# Patient Record
Sex: FEMALE | URBAN - METROPOLITAN AREA
[De-identification: names, ages, dates, MRNs, and addresses within clinical notes are randomized per-mention and may not be internally consistent; named-entity substitution may affect disease eponyms.]

---

## 2017-09-22 ENCOUNTER — OFFICE VISIT (OUTPATIENT)
Dept: INTERNAL MEDICINE CLINIC | Age: 37
End: 2017-09-22

## 2017-09-22 VITALS
HEART RATE: 48 BPM | DIASTOLIC BLOOD PRESSURE: 59 MMHG | BODY MASS INDEX: 31.84 KG/M2 | RESPIRATION RATE: 16 BRPM | WEIGHT: 137.6 LBS | SYSTOLIC BLOOD PRESSURE: 106 MMHG | HEIGHT: 55 IN | TEMPERATURE: 97.6 F

## 2017-09-22 DIAGNOSIS — R82.90 ABNORMAL URINALYSIS: ICD-10-CM

## 2017-09-22 DIAGNOSIS — M79.631 PAIN IN BOTH FOREARMS: ICD-10-CM

## 2017-09-22 DIAGNOSIS — M54.50 CHRONIC BILATERAL LOW BACK PAIN WITHOUT SCIATICA: ICD-10-CM

## 2017-09-22 DIAGNOSIS — M79.632 PAIN IN BOTH FOREARMS: ICD-10-CM

## 2017-09-22 DIAGNOSIS — M25.572 CHRONIC PAIN OF BOTH ANKLES: Primary | ICD-10-CM

## 2017-09-22 DIAGNOSIS — R31.9 HEMATURIA: ICD-10-CM

## 2017-09-22 DIAGNOSIS — M23.8X1 KNEE CREPITUS, RIGHT: ICD-10-CM

## 2017-09-22 DIAGNOSIS — M25.571 CHRONIC PAIN OF BOTH ANKLES: Primary | ICD-10-CM

## 2017-09-22 DIAGNOSIS — G89.29 CHRONIC PAIN OF BOTH ANKLES: Primary | ICD-10-CM

## 2017-09-22 DIAGNOSIS — G89.29 CHRONIC BILATERAL LOW BACK PAIN WITHOUT SCIATICA: ICD-10-CM

## 2017-09-22 LAB
BILIRUB UR QL STRIP: NEGATIVE
GLUCOSE UR-MCNC: NEGATIVE MG/DL
KETONES P FAST UR STRIP-MCNC: NEGATIVE MG/DL
PH UR STRIP: 5.5 [PH] (ref 4.6–8)
PROT UR QL STRIP: NEGATIVE MG/DL
SP GR UR STRIP: 1.02 (ref 1–1.03)
UA UROBILINOGEN AMB POC: NORMAL (ref 0.2–1)
URINALYSIS CLARITY POC: CLEAR
URINALYSIS COLOR POC: YELLOW
URINE BLOOD POC: NORMAL
URINE LEUKOCYTES POC: NORMAL
URINE NITRITES POC: NEGATIVE

## 2017-09-22 NOTE — PROGRESS NOTES
History of Present Illness:   Ivette Gonzalez is a 29 y.o. female here for evaluation:    Pt speaks only [place], but prefers for  to translate. Waiver signed by pt using [employer] . Chief Complaint   Patient presents with   [facility] Establish Care    Leg Pain     both legs of and on from knees down x 1 yr    Arm Pain     both arms off and on for 1 yr     Notes:  Pt presents today with her family  Pt  will be translating for his wife  Pt moved here 3 months ago from [facility]   Pt filled out  waiver form using [employer] # [id number] -[place]       Notes pains seem different in UE's and LE's--not related. Notes UE pain is primarily in her forearms bilat. Has pain which is aching even at rest.  Worse with activity/using UE's. Improves with paracetamol or massage. Notes pain at rest.    Notes weakness in her hands associated with the pain. For the LE pain:  Notes in legs, pain is not aching like hands  She notes when squats, right knee will make a \"cracking\" sound. No prior injury reported to right knee. Notes aching of her ankles bilat. There is weakness in ankles also. Notes weakness related to aching and pain. Notes foot aching is sometimes. No FH noted, except her mom has same problems but no clear dx. She has not had eval prior for this-had emigrated from [place] 3mo ago. They have copy of health dept labs at home. No abnormalities noted with that eval.        No chronic medical dx's noted. Prior to Admission medications    Not on File        ROS  Complete ROS negative except as indicated in note. Vitals:    09/22/17 1514   BP: 106/59   Pulse: (!) 48   Resp: 16   Temp: 97.6 °F (36.4 °C)   TempSrc: Oral   Weight: 137 lb 9.6 oz (62.4 kg)   Height: 1' 11.72\" (0.602 m)   PainSc:   0 - No pain   LMP: 09/01/2017        Physical Exam:     Physical Exam   Constitutional: She appears well-developed and well-nourished. No distress.    HENT:   Head: Normocephalic and atraumatic. Eyes: Conjunctivae are normal. Right eye exhibits no discharge. Left eye exhibits no discharge. No scleral icterus. Neck: Neck supple. Cardiovascular: Normal rate, regular rhythm, normal heart sounds and intact distal pulses. Exam reveals no gallop and no friction rub. No murmur heard. Pulmonary/Chest: Effort normal and breath sounds normal. No respiratory distress. She has no wheezes. She has no rales. She exhibits no tenderness. Abdominal: Soft. Bowel sounds are normal. She exhibits no distension. There is no tenderness. Musculoskeletal: She exhibits no edema, tenderness or deformity. No synovitis noted bilat hands or ankles bilat. Motor grossly intact and symmetric bilat LE's and UE's. No erythema noted. Pain noted with ant/post, lateral/medial stress ankles bilat. Bilat ankles stable to ant/post, lateral/medial stress. Neurological: She is alert. She exhibits normal muscle tone. Coordination normal.   Possible decreased sensation to light touch bilat UE's. Intact sensation bilat LE's and UE's to monofilament (feet to ankles bilat; hands to wrist bilat). Skin: Skin is warm. No rash noted. She is not diaphoretic. No erythema. No pallor. Psychiatric: She has a normal mood and affect.  Her behavior is normal. Judgment and thought content normal.       Results for orders placed or performed in visit on 09/22/17   AMB POC URINALYSIS DIP STICK AUTO W/O MICRO   Result Value Ref Range    Color (UA POC) Yellow     Clarity (UA POC) Clear     Glucose (UA POC) Negative Negative    Bilirubin (UA POC) Negative Negative    Ketones (UA POC) Negative Negative    Specific gravity (UA POC) 1.025 1.001 - 1.035    Blood (UA POC) 2+ Negative    pH (UA POC) 5.5 4.6 - 8.0    Protein (UA POC) Negative Negative mg/dL    Urobilinogen (UA POC) 0.2 mg/dL 0.2 - 1    Nitrites (UA POC) Negative Negative    Leukocyte esterase (UA POC) Trace Negative         Assessment and Plan: ICD-10-CM ICD-9-CM    1. Chronic pain of both ankles M25.571 719.47 T4, FREE    G89.29 338.29 TSH 3RD GENERATION    M25.572  CK      VITAMIN D, 25 HYDROXY      VITAMIN B12 & FOLATE      HEMOGLOBIN A1C WITH EAG      ANTINUCLEAR ANTIBODIES, IFA      RA + CCP ABS      C REACTIVE PROTEIN, QT      SED RATE (ESR)      CBC WITH AUTOMATED DIFF      METABOLIC PANEL, COMPREHENSIVE      REFERRAL TO PHYSICAL THERAPY   2. Pain in both forearms M79.632 729.5 T4, FREE    M79.631  TSH 3RD GENERATION      CK      VITAMIN D, 25 HYDROXY      VITAMIN B12 & FOLATE      HEMOGLOBIN A1C WITH EAG      ANTINUCLEAR ANTIBODIES, IFA      RA + CCP ABS      C REACTIVE PROTEIN, QT      SED RATE (ESR)      CBC WITH AUTOMATED DIFF      METABOLIC PANEL, COMPREHENSIVE      REFERRAL TO PHYSICAL THERAPY   3. Knee crepitus, right M23.8X1 719.66 XR KNEE RT MAX 2 VWS      REFERRAL TO PHYSICAL THERAPY   4. Chronic bilateral low back pain without sciatica G49.2 415.0 METABOLIC PANEL, COMPREHENSIVE    G89.29 338.29 AMB POC URINALYSIS DIP STICK AUTO W/O MICRO      HLA-B27      REFERRAL TO PHYSICAL THERAPY   5. Hematuria R31.9 599.70 MICROSCOPIC EXAMINATION      CULTURE, URINE   6. Abnormal urinalysis R82.90 791.9 MICROSCOPIC EXAMINATION      CULTURE, URINE       1,2,3,4:  Non-fasting labs, x-ray right knee, PT referral reviewed with pt and  at visit. If above labs do not suggest alternative dx, consider neuro and/or rheumatology referral(s) at follow-up lab review. Continued paracetamol, with PRN naprosyn reviewed with  at visit. Requested  bring prior health dept labs and vaccine records to next visit.    5,6:  Reviewed with --results and additional testing. Consider renal or  referral at follow-up, if hematuria persists, and not UTI-related. Follow-up Disposition:  Return in about 2 weeks (around 10/6/2017) for medication follow-up, lab review--1-2 weeks.   lab results and schedule of future lab studies reviewed with patient  reviewed diet, exercise and weight control  reviewed medications and side effects in detail  radiology results and schedule of future radiology studies reviewed with patient    For additional documentation of information and/or recommendations discussed this visit, please see notes in instructions. Plan and evaluation (above) reviewed with pt at visit  Patient voiced understanding of plan and provided with time to ask/review questions. After Visit Summary (AVS) provided to pt after visit with additional instructions as needed/reviewed.

## 2017-09-22 NOTE — PATIENT INSTRUCTIONS
You can continue to take paracetamol as needed for your pain. --Typically take 500mg to 650mg every 6 hours. --Do not need to take with food. --Do not drink alcohol with paracetamol (acetaminophen or Tylenol). You can take naprosyn (Aleve) with the paracetamol as reviewed. --This medication should be taken with food. --The over the counter dose/tablet is usually 220mg. --You can take two of the 220mg tabs (440mg total per dose) every 12 hours (two times a day) with food, for pain to see if helps. --This is a better medicine if the pain is from inflammation. --If you get stomach pain or discomfort with the naprosyn, stop taking and will review at follow-up. Learning About Living James Garcia  What is a living will? A living will is a legal form you use to write down the kind of care you want at the end of your life. It is used by the health professionals who will treat you if you aren't able to decide for yourself. If you put your wishes in writing, your loved ones and others will know what kind of care you want. They won't need to guess. This can ease your mind and be helpful to others. A living will is not the same as an estate or property will. An estate will explains what you want to happen with your money and property after you die. Is a living will a legal document? A living will is a legal document. Each state has its own laws about living [place]. If you move to another state, make sure that your living will is legal in the state where you now live. Or you might use a universal form that has been approved by many states. This kind of form can sometimes be completed and stored online. Your electronic copy will then be available wherever you have a connection to the Internet. In most cases, doctors will respect your wishes even if you have a form from a different state. · You don't need an  to complete a living will.  But legal advice can be helpful if your state's laws are unclear, your health history is complicated, or your family can't agree on what should be in your living will. · You can change your living will at any time. Some people find that their wishes about end-of-life care change as their health changes. · In addition to making a living will, think about completing a medical power of  form. This form lets you name the person you want to make end-of-life treatment decisions for you (your \"health care agent\") if you're not able to. Many hospitals and nursing homes will give you the forms you need to complete a living will and a medical power of . · Your living will is used only if you can't make or communicate decisions for yourself anymore. If you become able to make decisions again, you can accept or refuse any treatment, no matter what you wrote in your living will. · Your state may offer an online registry. This is a place where you can store your living will online so the doctors and nurses who need to treat you can find it right away. What should you think about when creating a living will? Talk about your end-of-life wishes with your family members and your doctor. Let them know what you want. That way the people making decisions for you won't be surprised by your choices. Think about these questions as you make your living will:  · Do you know enough about life support methods that might be used? If not, talk to your doctor so you know what might be done if you can't breathe on your own, your heart stops, or you're unable to swallow. · What things would you still want to be able to do after you receive life-support methods? Would you want to be able to walk? To speak? To eat on your own? To live without the help of machines? · If you have a choice, where do you want to be cared for? In your home? At a hospital or nursing home? · Do you want certain Ep[employer]opalian practices performed if you become very ill?   · If you have a choice at the end of your life, where would you prefer to die? At home? In a hospital or nursing home? Somewhere else? · Would you prefer to be buried or cremated? · Do you want your organs to be donated after you die? What should you do with your living will? · Make sure that your family members and your health care agent have copies of your living will. · Give your doctor a copy of your living will to keep in your medical record. If you have more than one doctor, make sure that each one has a copy. · You may want to put a copy of your living will where it can be easily found. Where can you learn more? Go to http://alan-elise.info/. Enter X230 in the search box to learn more about \"Learning About Living [place]. \"  Current as of: August 8, 2016  Content Version: 11.3  © 5720-8654 [employer]. Care instructions adapted under license by [employer] (which disclaims liability or warranty for this information). If you have questions about a medical condition or this instruction, always ask your healthcare professional. [address] any warranty or liability for your use of this information.

## 2017-09-22 NOTE — MR AVS SNAPSHOT
Visit Information Date & Time Provider Department Dept. Phone Encounter #  
 9/22/2017  2:45 PM Osiris Quezada, [address] [place] [address] and Internal Medicine [phone] [id number] Follow-up Instructions Return in about 2 weeks (around 10/6/2017) for medication follow-up, lab review--1-2 weeks. Upcoming Health Maintenance Date Due DTaP/Tdap/Td series (1 - Tdap) 12/3/2002 PAP AKA CERVICAL CYTOLOGY 12/3/2002 INFLUENZA AGE 9 TO ADULT 8/1/2017 Allergies as of 9/22/2017  Review Complete On: 9/22/2017 By: Osiris Quezada MD  
 Not on File Current Immunizations  Never Reviewed No immunizations on file. Not reviewed this visit You Were Diagnosed With   
  
 Codes Comments Chronic pain of both ankles    -  Primary ICD-10-CM: M25.571, G89.29, M25.572 ICD-9-CM: 719.47, 338.29 Pain in both forearms     ICD-10-CM: [address], G14.973 ICD-9-CM: 729.5 Knee crepitus, right     ICD-10-CM: M23.8X1 ICD-9-CM: 719.66 Chronic bilateral low back pain without sciatica     ICD-10-CM: M54.5, G89.29 ICD-9-CM: 724.2, 338.29 Vitals BP Pulse Temp Resp Height(growth percentile) Weight(growth percentile) 106/59 (BP 1 Location: Left arm, BP Patient Position: Sitting) (!) 48 97.6 °F (36.4 °C) (Oral) 16 1' 11.72\" (0.602 m) 137 lb 9.6 oz (62.4 kg) LMP BMI OB Status Smoking Status 09/01/2017 171.95 kg/m2 Having regular periods Never Smoker BMI and BSA Data Body Mass Index Body Surface Area  
 171.95 kg/m 2 1.02 m 2 Preferred Pharmacy Pharmacy Name Phone [id number] [address] [phone] Your Updated Medication List  
  
Notice  As of 9/22/2017  4:03 PM  
 You have not been prescribed any medications. We Performed the Following AMB POC URINALYSIS DIP STICK AUTO W/O MICRO [51818 CPT(R)] ANTINUCLEAR ANTIBODIES, IFA [id number] CPT(R)] C REACTIVE PROTEIN, QT [11476 CPT(R)] CBC WITH AUTOMATED DIFF [95860 CPT(R)] CK [id number] CPT(R)] HEMOGLOBIN A1C WITH EAG [14204 CPT(R)] HLA-B27 [id number] CPT(R)] METABOLIC PANEL, COMPREHENSIVE [44780 CPT(R)]   
 RA + CCP ABS [[id number] Custom] REFERRAL TO PHYSICAL THERAPY [MMQ15 Custom] Comments:  
 Please evaluate patient for lower back pain, bilateral forearm and hand pain, leg and bilateral ankle pain, right knee pain/crepitus. T4, FREE [id number] CPT(R)] TSH 3RD GENERATION [73776 CPT(R)] VITAMIN B12 & FOLATE [18113 CPT(R)] VITAMIN D, 25 HYDROXY [id number] CPT(R)] Follow-up Instructions Return in about 2 weeks (around 10/6/2017) for medication follow-up, lab review--1-2 weeks. To-Do List   
 09/22/2017 Lab:  SED RATE (ESR)   
  
 09/22/2017 Imaging:  XR KNEE RT MAX 2 VWS Referral Information Referral ID Referred By Referred To  
  
 [id number] Fransicso Mcclendon Not Available Visits Status Start Date End Date 1 New Request 9/22/17 9/22/18 If your referral has a status of pending review or denied, additional information will be sent to support the outcome of this decision. Patient Instructions You can continue to take paracetamol as needed for your pain. --Typically take 500mg to 650mg every 6 hours. --Do not need to take with food. --Do not drink alcohol with paracetamol (acetaminophen or Tylenol). You can take naprosyn (Aleve) with the paracetamol as reviewed. --This medication should be taken with food. --The over the counter dose/tablet is usually 220mg. --You can take two of the 220mg tabs (440mg total per dose) every 12 hours (two times a day) with food, for pain to see if helps. --This is a better medicine if the pain is from inflammation. --If you get stomach pain or discomfort with the naprosyn, stop taking and will review at follow-up. [address] What is a living will? A living will is a legal form you use to write down the kind of care you want at the end of your life. It is used by the health professionals who will treat you if you aren't able to decide for yourself. If you put your wishes in writing, your loved ones and others will know what kind of care you want. They won't need to guess. This can ease your mind and be helpful to others. A living will is not the same as an estate or property will. An estate will explains what you want to happen with your money and property after you die. Is a living will a legal document? A living will is a legal document. Each state has its own laws about living [place]. If you move to another state, make sure that your living will is legal in the state where you now live. Or you might use a universal form that has been approved by many states. This kind of form can sometimes be completed and stored online. Your electronic copy will then be available wherever you have a connection to the Internet. In most cases, doctors will respect your wishes even if you have a form from a different state. · You don't need an  to complete a living will. But legal advice can be helpful if your state's laws are unclear, your health history is complicated, or your family can't agree on what should be in your living will. · You can change your living will at any time. Some people find that their wishes about end-of-life care change as their health changes. · In addition to making a living will, think about completing a medical power of  form. This form lets you name the person you want to make end-of-life treatment decisions for you (your \"health care agent\") if you're not able to. Many hospitals and nursing homes will give you the forms you need to complete a living will and a medical power of .  
· Your living will is used only if you can't make or communicate decisions for yourself anymore. If you become able to make decisions again, you can accept or refuse any treatment, no matter what you wrote in your living will. · Your state may offer an online registry. This is a place where you can store your living will online so the doctors and nurses who need to treat you can find it right away. What should you think about when creating a living will? Talk about your end-of-life wishes with your family members and your doctor. Let them know what you want. That way the people making decisions for you won't be surprised by your choices. Think about these questions as you make your living will: · Do you know enough about life support methods that might be used? If not, talk to your doctor so you know what might be done if you can't breathe on your own, your heart stops, or you're unable to swallow. · What things would you still want to be able to do after you receive life-support methods? Would you want to be able to walk? To speak? To eat on your own? To live without the help of machines? · If you have a choice, where do you want to be cared for? In your home? At a hospital or nursing home? · Do you want certain [employer] practices performed if you become very ill? · If you have a choice at the end of your life, where would you prefer to die? At home? In a hospital or nursing home? Somewhere else? · Would you prefer to be buried or cremated? · Do you want your organs to be donated after you die? What should you do with your living will? · Make sure that your family members and your health care agent have copies of your living will. · Give your doctor a copy of your living will to keep in your medical record. If you have more than one doctor, make sure that each one has a copy. · You may want to put a copy of your living will where it can be easily found. Where can you learn more? Go to http://alan-elise.info/. Enter S600 in the search box to learn more about \"Learning About Living Joyce. \" Current as of: August 8, 2016 Content Version: 11.3 © 9221-0516 [email or web address]. Care instructions adapted under license by [employer] (which disclaims liability or warranty for this information). If you have questions about a medical condition or this instruction, always ask your healthcare professional. [address] any warranty or liability for your use of this information. Introducing [facility]! Rupa Ga introduces [employer] patient portal. Now you can access parts of your medical record, email your doctor's office, and request medication refills online. 1. In your internet browser, go to [email or web address] 2. Click on the First Time User? Click Here link in the Sign In box. You will see the New Member Sign Up page. 3. Enter your [employer] Access Code exactly as it appears below. You will not need to use this code after youve completed the sign-up process. If you do not sign up before the expiration date, you must request a new code. · [employer] Access Code: [email or web address] Expires: 12/21/2017  2:18 PM 
 
4. Enter the last four digits of your Social Security Number (xxxx) and Date of Birth (mm/dd/yyyy) as indicated and click Submit. You will be taken to the next sign-up page. 5. Create a [employer] ID. This will be your [employer] login ID and cannot be changed, so think of one that is secure and easy to remember. 6. Create a [employer] password. You can change your password at any time. 7. Enter your Password Reset Question and Answer. This can be used at a later time if you forget your password. 8. Enter your e-mail address. You will receive e-mail notification when new information is available in [address] 9. Click Sign Up. You can now view and download portions of your medical record. 10. Click the Download Summary menu link to download a portable copy of your medical information. If you have questions, please visit the Frequently Asked Questions section of the [employer] website. Remember, [employer] is NOT to be used for urgent needs. For medical emergencies, dial 911. Now available from your iPhone and Android! Please provide this summary of care documentation to your next provider. Your primary care clinician is listed as [facility]. If you have any questions after today's visit, please call [phone].

## 2017-09-23 LAB
BACTERIA #/AREA URNS HPF: ABNORMAL /[HPF]
CASTS URNS QL MICRO: ABNORMAL /LPF
CRYSTALS URNS MICRO: ABNORMAL
EPI CELLS #/AREA URNS HPF: >10 /HPF
MUCOUS THREADS URNS QL MICRO: PRESENT
RBC #/AREA URNS HPF: ABNORMAL /HPF
UNIDENT CRYS URNS QL MICRO: PRESENT
WBC #/AREA URNS HPF: ABNORMAL /HPF

## 2017-09-25 LAB
BACTERIA UR CULT: NORMAL
BACTERIA UR CULT: NORMAL

## 2017-09-27 LAB
25(OH)D3+25(OH)D2 SERPL-MCNC: 23.5 NG/ML (ref 30–100)
ALBUMIN SERPL-MCNC: 4.4 G/DL (ref 3.5–5.5)
ALBUMIN/GLOB SERPL: 1.6 {RATIO} (ref 1.2–2.2)
ALP SERPL-CCNC: 89 IU/L (ref 39–117)
ALT SERPL-CCNC: 14 IU/L (ref 0–32)
ANA TITR SER IF: NEGATIVE {TITER}
AST SERPL-CCNC: 17 IU/L (ref 0–40)
BASOPHILS # BLD AUTO: 0 X10E3/UL (ref 0–0.2)
BASOPHILS NFR BLD AUTO: 0 %
BILIRUB SERPL-MCNC: 0.4 MG/DL (ref 0–1.2)
BUN SERPL-MCNC: 14 MG/DL (ref 6–20)
BUN/CREAT SERPL: 26 (ref 9–23)
CALCIUM SERPL-MCNC: 9.3 MG/DL (ref 8.7–10.2)
CHLORIDE SERPL-SCNC: 100 MMOL/L (ref 96–106)
CK SERPL-CCNC: 114 U/L (ref 24–173)
CO2 SERPL-SCNC: 29 MMOL/L (ref 18–29)
CREAT SERPL-MCNC: 0.53 MG/DL (ref 0.57–1)
CRP SERPL-MCNC: 0.5 MG/L (ref 0–4.9)
EOSINOPHIL # BLD AUTO: 0.1 X10E3/UL (ref 0–0.4)
EOSINOPHIL NFR BLD AUTO: 2 %
ERYTHROCYTE [DISTWIDTH] IN BLOOD BY AUTOMATED COUNT: 13.4 % (ref 12.3–15.4)
ERYTHROCYTE [SEDIMENTATION RATE] IN BLOOD BY WESTERGREN METHOD: 2 MM/HR (ref 0–32)
EST. AVERAGE GLUCOSE BLD GHB EST-MCNC: 97 MG/DL
FOLATE SERPL-MCNC: 15.3 NG/ML
GLOBULIN SER CALC-MCNC: 2.8 G/DL (ref 1.5–4.5)
GLUCOSE SERPL-MCNC: 74 MG/DL (ref 65–99)
HBA1C MFR BLD: 5 % (ref 4.8–5.6)
HCT VFR BLD AUTO: 38.2 % (ref 34–46.6)
HGB BLD-MCNC: 12.6 G/DL (ref 11.1–15.9)
HLA-B27 QL NAA+PROBE: NEGATIVE
IMM GRANULOCYTES # BLD: 0 X10E3/UL (ref 0–0.1)
IMM GRANULOCYTES NFR BLD: 0 %
LYMPHOCYTES # BLD AUTO: 3.3 X10E3/UL (ref 0.7–3.1)
LYMPHOCYTES NFR BLD AUTO: 46 %
MCH RBC QN AUTO: 27.3 PG (ref 26.6–33)
MCHC RBC AUTO-ENTMCNC: 33 G/DL (ref 31.5–35.7)
MCV RBC AUTO: 83 FL (ref 79–97)
MONOCYTES # BLD AUTO: 0.4 X10E3/UL (ref 0.1–0.9)
MONOCYTES NFR BLD AUTO: 6 %
NEUTROPHILS # BLD AUTO: 3.3 X10E3/UL (ref 1.4–7)
NEUTROPHILS NFR BLD AUTO: 46 %
PLATELET # BLD AUTO: 362 X10E3/UL (ref 150–379)
POTASSIUM SERPL-SCNC: 4.4 MMOL/L (ref 3.5–5.2)
PROT SERPL-MCNC: 7.2 G/DL (ref 6–8.5)
RBC # BLD AUTO: 4.62 X10E6/UL (ref 3.77–5.28)
SODIUM SERPL-SCNC: 140 MMOL/L (ref 134–144)
T4 FREE SERPL-MCNC: 0.98 NG/DL (ref 0.82–1.77)
TSH SERPL DL<=0.005 MIU/L-ACNC: 1.69 UIU/ML (ref 0.45–4.5)
VIT B12 SERPL-MCNC: 335 PG/ML (ref 211–946)
WBC # BLD AUTO: 7.1 X10E3/UL (ref 3.4–10.8)

## 2017-10-10 ENCOUNTER — OFFICE VISIT (OUTPATIENT)
Dept: INTERNAL MEDICINE CLINIC | Age: 37
End: 2017-10-10

## 2017-10-10 VITALS
RESPIRATION RATE: 18 BRPM | DIASTOLIC BLOOD PRESSURE: 57 MMHG | HEART RATE: 67 BPM | OXYGEN SATURATION: 98 % | BODY MASS INDEX: 27.01 KG/M2 | WEIGHT: 137.6 LBS | SYSTOLIC BLOOD PRESSURE: 106 MMHG | HEIGHT: 60 IN | TEMPERATURE: 98 F

## 2017-10-10 DIAGNOSIS — Z87.448 HISTORY OF HEMATURIA: ICD-10-CM

## 2017-10-10 DIAGNOSIS — E55.9 HYPOVITAMINOSIS D: ICD-10-CM

## 2017-10-10 DIAGNOSIS — M79.18 MUSCULOSKELETAL PAIN: Primary | ICD-10-CM

## 2017-10-10 RX ORDER — CHOLECALCIFEROL TAB 125 MCG (5000 UNIT) 125 MCG
5000 TAB ORAL DAILY
Qty: 90 TAB | Refills: 0 | Status: SHIPPED | OUTPATIENT
Start: 2017-10-10

## 2017-10-10 NOTE — PROGRESS NOTES
History of Present Illness:   Jen Ferrell is a 29 y.o. female here for evaluation:    Pt speaks only [place], but prefers for  to translate. Waiver signed Sept 2017 visit. Chief Complaint   Patient presents with    Medication Evaluation     lab reveiw     Nursing notes:  Patient presents today with  who will translate for his wife today for a one month follow up on medication and to review labs from 9/22/2017  Patient is still having pain in bilateral hands , feet & lower back. Patient would like paper scripts for any medication prescribed     Reviewed prior labs. Normal except for mild low Vit D. Reviewed neuro eval.  No rheum eval at this time, given normal HLA-B27, ESR, CRP, TITI. They had single PT eval, and instructed to do exercises at home--notes not helping pain. Notes primary pain locations lower lumbar back, bilat wrists, bilat ankles. Prior to Admission medications    Not on File        ROS    Vitals:    10/10/17 0902   BP: 106/57   Pulse: 67   Resp: 18   Temp: 98 °F (36.7 °C)   TempSrc: Oral   SpO2: 98%   Weight: 137 lb 9.6 oz (62.4 kg)   Height: 5' 0.35\" (1.533 m)   PainSc:   0 - No pain   LMP: 10/01/2017        Physical Exam:     Physical Exam   Constitutional: She appears well-developed and well-nourished. No distress. HENT:   Head: Normocephalic and atraumatic. Eyes: Conjunctivae are normal. Right eye exhibits no discharge. Left eye exhibits no discharge. No scleral icterus. Cardiovascular: Normal rate, regular rhythm, normal heart sounds and intact distal pulses. Exam reveals no gallop and no friction rub. No murmur heard. Pulmonary/Chest: Effort normal and breath sounds normal. No respiratory distress. She has no wheezes. She has no rales. She exhibits no tenderness. Abdominal: She exhibits no distension. Musculoskeletal: She exhibits no edema, tenderness or deformity. No synovitis noted on wrist exam bilat. Neurological: She is alert.  She exhibits normal muscle tone. Coordination normal.   Skin: Skin is warm. No rash noted. She is not diaphoretic. No erythema. No pallor. Psychiatric: She has a normal mood and affect. Her behavior is normal. Judgment and thought content normal.       Assessment and Plan:       ICD-10-CM ICD-9-CM    1. Musculoskeletal pain M79.1 729.1 REFERRAL TO NEUROLOGY   2. Hypovitaminosis D E55.9 268.9 cholecalciferol, VITAMIN D3, (VITAMIN D3) 5,000 unit tab tablet   3. History of hematuria Z87.448 V13.09        1. Eval with neuro reviewed since rheum/inflammatory testing normal.  No Rheumatoid factor done (but ordered)--update with next labs. 2.  Repletion reviewed. 3.  Repeat UA at follow-up. No RBC's on microscopic. No infection. Normal creatinine. Follow-up Disposition:  Return in about 3 months (around 1/10/2018), or if symptoms worsen or fail to improve, for referral follow-up.  reviewed diet, exercise and weight control  reviewed medications and side effects in detail  radiology results and schedule of future radiology studies reviewed with patient--pt/ not interested--plan review at follow-up if needs to see rheumatology. For additional documentation of information and/or recommendations discussed this visit, please see notes in instructions. Plan and evaluation (above) reviewed with pt at visit  Patient voiced understanding of plan and provided with time to ask/review questions. After Visit Summary (AVS) provided to pt after visit with additional instructions as needed/reviewed. Not interested in influenza vaccine today.

## 2017-10-10 NOTE — MR AVS SNAPSHOT
Visit Information Date & Time Provider Department Dept. Phone Encounter #  
 10/10/2017  8:45 AM Hamida Ferro, [address] [place] [address] and Internal Medicine [phone] [id number] Follow-up Instructions Return in about 3 months (around 1/10/2018), or if symptoms worsen or fail to improve, for referral follow-up. Upcoming Health Maintenance Date Due DTaP/Tdap/Td series (1 - Tdap) 12/3/2002 PAP AKA CERVICAL CYTOLOGY 12/3/2002 Allergies as of 10/10/2017  Review Complete On: 10/10/2017 By: Hamida Ferro MD  
 No Known Allergies Current Immunizations  Reviewed on 10/10/2017 No immunizations on file. Reviewed by Hamida Ferro MD on 10/10/2017 at  9:35 AM  
You Were Diagnosed With   
  
 Codes Comments Musculoskeletal pain    -  Primary ICD-10-CM: M79.1 ICD-9-CM: 729.1 Hypovitaminosis D     ICD-10-CM: E55.9 ICD-9-CM: 268.9 Vitals BP Pulse Temp Resp Height(growth percentile) Weight(growth percentile) 106/57 (BP 1 Location: Right arm, BP Patient Position: Sitting) 67 98 °F (36.7 °C) (Oral) 18 5' 0.35\" (1.533 m) 137 lb 9.6 oz (62.4 kg) LMP SpO2 BMI OB Status Smoking Status 10/01/2017 98% 26.56 kg/m2 Having regular periods Never Smoker Vitals History BMI and BSA Data Body Mass Index Body Surface Area  
 26.56 kg/m 2 1.63 m 2 Preferred Pharmacy Pharmacy Name Phone [id number] [address] [facility]. [phone] Your Updated Medication List  
  
   
This list is accurate as of: 10/10/17  9:40 AM.  Always use your most recent med list.  
  
  
  
  
 cholecalciferol (VITAMIN D3) 5,000 unit Tab tablet Commonly known as:  VITAMIN D3 Take 1 Tab by mouth daily. Prescriptions Printed Refills  
 cholecalciferol, VITAMIN D3, (VITAMIN D3) 5,000 unit tab tablet 0 Sig: Take 1 Tab by mouth daily. Class: Print  Route: Oral  
  
 We Performed the Following REFERRAL TO NEUROLOGY [LVT61 Custom] Comments:  
 Please evaluate for bilat UE and ankle pain--evaluate neuropathy. Follow-up Instructions Return in about 3 months (around 1/10/2018), or if symptoms worsen or fail to improve, for referral follow-up. Referral Information Referral ID Referred By Referred To  
  
 [id number] Marcia Ramirez MD   
   [address] [facility] [address] [place], [address] Phone: [phone] Fax: [phone] Visits Status Start Date End Date 1 New Request 10/10/17 10/10/18 If your referral has a status of pending review or denied, additional information will be sent to support the outcome of this decision. Patient Instructions 1. Complete 3mo of the vitamin D supplement, then continue as 1,000 to 2,000 units of vitamin D daily. Will plan to repeat your level in 3-6mo. 2.  Please follow the following instructions to process/authorize your referral, as reviewed today: 
Referrals processing Please verify with your insurance IF you need referral authorization submitted. For insurance plans which require this, please follow the following steps. FAILURE TO DO SO MAY RESULT IN INABILITY TO SEE THE SPECIALIST YOU HAVE BEEN REFERRED TO.  
1. Call and schedule appointment with specialist 
2. Call our clinic and leave message with provider name, and date of appointment 3. We will then submit the referral to your insurance. This process takes 2-5 business days. If you have questions about scheduling or authorizing referral, you can review with our referral coordinators Nolvia Melvin. or Misa Wells) at the . You can review with them today if available/if you have time, or you can call to review with them once you have made your referral/appointment.  
 
If you are not sure if you need referral authorizations, please review with the referral coordinators, either prior to or after you have made the appointment, as reviewed. Learning About Vitamin D Why is it important to get enough vitamin D? Your body needs vitamin D to absorb calcium. Calcium keeps your bones and muscles, including your heart, healthy and strong. If your muscles don't get enough calcium, they can cramp, hurt, or feel weak. You may have long-term (chronic) muscle aches and pains. If you don't get enough vitamin D throughout life, you have an increased chance of having thin and brittle bones (osteoporosis) in your later years. Children who don't get enough vitamin D may not grow as much as others their age. They also have a chance of getting a rare disease called rickets. It causes weak bones. Vitamin D and calcium are added to many foods. And your body uses sunshine to make its own vitamin D. How much vitamin D do you need? The [place] of Medicine recommends that people ages 3 through 79 get 600 IU (international units) every day. Adults 71 and older need 800 IU every day. Blood tests for vitamin D can check your vitamin D level. But there is no standard normal range used by all laboratories. The [place] of Medicine recommends a blood level of 20 ng/mL of vitamin D for healthy bones. And most people in the [place] and [facility]) meet this goal. 
How can you get more vitamin D? Foods that contain vitamin D include: 
· [place], tuna, and mackerel. These are some of the best foods to eat when you need to get more vitamin D. 
· Cheese, egg yolks, and beef liver. These foods have vitamin D in small amounts. · Milk, soy drinks, orange juice, yogurt, margarine, and some kinds of cereal have vitamin D added to them. Some people don't make vitamin D as well as others. They may have to take extra care in getting enough vitamin D. Things that reduce how much vitamin D your body makes include: · Dark skin, such as many  Americans have. · Age, especially if you are older than 72. · Digestive problems, such as Crohn's or celiac disease. · Liver and kidney disease. Some people who do not get enough vitamin D may need supplements. Are there any risks from taking vitamin D? 
· Too much vitamin D: 
¨ Can damage your kidneys. ¨ Can cause nausea and vomiting, constipation, and weakness. ¨ Raises the amount of calcium in your blood. If this happens, you can get confused or have an irregular heart rhythm. · Vitamin D may interact with other medicines. Tell your doctor about all of the medicines you take, including over-the-counter drugs, herbs, and pills. Tell your doctor about all of your current medical problems. Where can you learn more? Go to http://alan-elise.info/. Enter [id number] in the search box to learn more about \"Learning About Vitamin D.\" 
Current as of: July 26, 2016 Content Version: 11.3 © 6519-8200 NotesFirst. Care instructions adapted under license by [employer] (which disclaims liability or warranty for this information). If you have questions about a medical condition or this instruction, always ask your healthcare professional. [place] [address] any warranty or liability for your use of this information. Introducing [facility]! Stacey Crocker introduces [employer] patient portal. Now you can access parts of your medical record, email your doctor's office, and request medication refills online. 1. In your internet browser, go to [email or web address] 2. Click on the First Time User? Click Here link in the Sign In box. You will see the New Member Sign Up page. 3. Enter your Syniverse Access Code exactly as it appears below. You will not need to use this code after youve completed the sign-up process. If you do not sign up before the expiration date, you must request a new code.  
 
· Syn[employer] Access Code: [email or web address] 
 Expires: 12/21/2017  2:18 PM 
 
4. Enter the last four digits of your Social Security Number (xxxx) and Date of Birth (mm/dd/yyyy) as indicated and click Submit. You will be taken to the next sign-up page. 5. Create a [employer] ID. This will be your [employer] login ID and cannot be changed, so think of one that is secure and easy to remember. 6. Create a [employer] password. You can change your password at any time. 7. Enter your Password Reset Question and Answer. This can be used at a later time if you forget your password. 8. Enter your e-mail address. You will receive e-mail notification when new information is available in [address] 9. Click Sign Up. You can now view and download portions of your medical record. 10. Click the Download Summary menu link to download a portable copy of your medical information. If you have questions, please visit the Frequently Asked Questions section of the [employer] website. Remember, [employer] is NOT to be used for urgent needs. For medical emergencies, dial 911. Now available from your iPhone and Android! Please provide this summary of care documentation to your next provider. Your primary care clinician is listed as [address]. If you have any questions after today's visit, please call [phone].

## 2017-10-10 NOTE — PATIENT INSTRUCTIONS
1.  Complete 3mo of the vitamin D supplement, then continue as 1,000 to 2,000 units of vitamin D daily. Will plan to repeat your level in 3-6mo. 2.  Please follow the following instructions to process/authorize your referral, as reviewed today:  Referrals processing  Please verify with your insurance IF you need referral authorization submitted. For insurance plans which require this, please follow the following steps. FAILURE TO DO SO MAY RESULT IN INABILITY TO SEE THE SPECIALIST YOU HAVE BEEN REFERRED TO.   1. Call and schedule appointment with specialist  2. Call our clinic and leave message with provider name, and date of appointment  3. We will then submit the referral to your insurance. This process takes 2-5 business days. If you have questions about scheduling or authorizing referral, you can review with our referral coordinators Krista Villavicencio. or Justyn Roberts) at the . You can review with them today if available/if you have time, or you can call to review with them once you have made your referral/appointment. If you are not sure if you need referral authorizations, please review with the referral coordinators, either prior to or after you have made the appointment, as reviewed. Learning About Vitamin D  Why is it important to get enough vitamin D? Your body needs vitamin D to absorb calcium. Calcium keeps your bones and muscles, including your heart, healthy and strong. If your muscles don't get enough calcium, they can cramp, hurt, or feel weak. You may have long-term (chronic) muscle aches and pains. If you don't get enough vitamin D throughout life, you have an increased chance of having thin and brittle bones (osteoporosis) in your later years. Children who don't get enough vitamin D may not grow as much as others their age. They also have a chance of getting a rare disease called rickets. It causes weak bones. Vitamin D and calcium are added to many foods.  And your body uses sunshine to make its own vitamin D. How much vitamin D do you need? The [place] of Medicine recommends that people ages 3 through 79 get 600 IU (international units) every day. Adults 71 and older need 800 IU every day. Blood tests for vitamin D can check your vitamin D level. But there is no standard normal range used by all laboratories. The [place] of Medicine recommends a blood level of 20 ng/mL of vitamin D for healthy bones. And most people in the [place] and [place] meet this goal.  How can you get more vitamin D? Foods that contain vitamin D include:  · [place], tuna, and mackerel. These are some of the best foods to eat when you need to get more vitamin D.  · Cheese, egg yolks, and beef liver. These foods have vitamin D in small amounts. · Milk, soy drinks, orange juice, yogurt, margarine, and some kinds of cereal have vitamin D added to them. Some people don't make vitamin D as well as others. They may have to take extra care in getting enough vitamin D. Things that reduce how much vitamin D your body makes include:  · Dark skin, such as many  Americans have. · Age, especially if you are older than 72. · Digestive problems, such as Crohn's or celiac disease. · Liver and kidney disease. Some people who do not get enough vitamin D may need supplements. Are there any risks from taking vitamin D?  · Too much vitamin D:  ¨ Can damage your kidneys. ¨ Can cause nausea and vomiting, constipation, and weakness. ¨ Raises the amount of calcium in your blood. If this happens, you can get confused or have an irregular heart rhythm. · Vitamin D may interact with other medicines. Tell your doctor about all of the medicines you take, including over-the-counter drugs, herbs, and pills. Tell your doctor about all of your current medical problems. Where can you learn more? Go to http://alan-elise.info/.   Enter [id number] in the search box to learn more about \"Learning About Vitamin D.\"  Current as of: July 26, 2016  Content Version: 11.3  © 0387-8045 [employer], [employer]. Care instructions adapted under license by [employer] (which disclaims liability or warranty for this information). If you have questions about a medical condition or this instruction, always ask your healthcare professional. [place] [address] any warranty or liability for your use of this information.

## 2017-10-10 NOTE — PROGRESS NOTES
Rm 16      Chief Complaint   Patient presents with    Medication Evaluation     lab reveiw       Patient presents today with  who will translate for his wife today for a one month follow up on medication and to review labs from 9/22/2017  Patient is still having pain in bilateral hands , feet & lower back. Patient would like paper scripts for any medication prescribed      Health Maintenance Due   Topic Date Due    DTaP/Tdap/Td series (1 - Tdap) 12/03/2002    PAP AKA CERVICAL CYTOLOGY  12/03/2002    INFLUENZA AGE 9 TO ADULT  08/01/2017     Patient is due for a Dtap & a flu vaccine  Patient is due for a Pap exam      1. Have you been to the ER, urgent care clinic since your last visit? Hospitalized since your last visit? No    2. Have you seen or consulted any other health care providers outside of the [address] [place] [address] since your last visit? Include any pap smears or colon screening. No      Learning Assessment 9/22/2017   PRIMARY LEARNER Patient   PRIMARY LANGUAGE OTHER (COMMENT)   LEARNER PREFERENCE PRIMARY READING   ANSWERED BY patient   RELATIONSHIP SELF     PHQ over the last two weeks 10/10/2017   Little interest or pleasure in doing things Not at all   Feeling down, depressed or hopeless Not at all   Total Score PHQ 2 0     Abuse Screening Questionnaire 10/10/2017   Do you ever feel afraid of your partner? N   Are you in a relationship with someone who physically or mentally threatens you? N   Is it safe for you to go home?  Y     Living medical will information given at previous visit

## 2017-10-17 ENCOUNTER — OFFICE VISIT (OUTPATIENT)
Dept: NEUROLOGY | Age: 37
End: 2017-10-17

## 2017-10-17 VITALS
RESPIRATION RATE: 16 BRPM | WEIGHT: 136 LBS | BODY MASS INDEX: 26.7 KG/M2 | SYSTOLIC BLOOD PRESSURE: 108 MMHG | HEIGHT: 60 IN | HEART RATE: 98 BPM | OXYGEN SATURATION: 98 % | DIASTOLIC BLOOD PRESSURE: 60 MMHG

## 2017-10-17 DIAGNOSIS — M25.50 ARTHRALGIA, UNSPECIFIED JOINT: ICD-10-CM

## 2017-10-17 DIAGNOSIS — R20.2 PAINFUL PARESTHESIA: Primary | ICD-10-CM

## 2017-10-17 DIAGNOSIS — R52 PAINFUL PARESTHESIA: Primary | ICD-10-CM

## 2017-10-17 NOTE — MR AVS SNAPSHOT
Visit Information Date & Time Provider Department Dept. Phone Encounter #  
 10/17/2017 11:00 AM [address] [place] [address] Neurology Clinic at [address] [id number] Follow-up Instructions Return after EMG. Routing History Upcoming Health Maintenance Date Due DTaP/Tdap/Td series (1 - Tdap) 12/3/2002 PAP AKA CERVICAL CYTOLOGY 12/3/2002 Allergies as of 10/17/2017  Review Complete On: 10/17/2017 By: Viridiana Romero LPN No Known Allergies Current Immunizations  Reviewed on 10/10/2017 No immunizations on file. Not reviewed this visit You Were Diagnosed With   
  
 Codes Comments Painful paresthesia    -  Primary ICD-10-CM: R20.2 ICD-9-CM: 782.0 Arthralgia, unspecified joint     ICD-10-CM: M25.50 ICD-9-CM: 719.40 Vitals BP Pulse Resp Height(growth percentile) Weight(growth percentile) LMP  
 108/60 98 16 5' (1.524 m) 136 lb (61.7 kg) 10/01/2017 SpO2 BMI OB Status Smoking Status 98% 26.56 kg/m2 Having regular periods Never Smoker Vitals History BMI and BSA Data Body Mass Index Body Surface Area  
 26.56 kg/m 2 1.62 m 2 Preferred Pharmacy Pharmacy Name Phone [id number] [address] [phone] Your Updated Medication List  
  
   
This list is accurate as of: 10/17/17 11:19 AM.  Always use your most recent med list.  
  
  
  
  
 cholecalciferol (VITAMIN D3) 5,000 unit Tab tablet Commonly known as:  VITAMIN D3 Take 1 Tab by mouth daily. We Performed the Following REFERRAL TO RHEUMATOLOGY [SUQ49 Custom] Comments:  
 Painful joints, chronic Follow-up Instructions Return after EMG. To-Do List   
 10/17/2017 Neurology:  EMG NCV MOTOR WITH F/WAVE PER NERVE Referral Information Referral ID Referred By Referred To  
  
 [id number] Juanita Aguila Not Available Visits Status Start Date End Date 1 New Request 10/17/17 10/17/18 If your referral has a status of pending review or denied, additional information will be sent to support the outcome of this decision. Patient Instructions Electromyogram (EMG) and Nerve Conduction Studies: About These Tests What are they? An electromyogram (EMG) measures the electrical activity of your muscles when you are not using them (at rest) and when you tighten them (muscle contraction). Nerve conduction studies (NCS) measure how well and how fast the nerves can send electrical signals. EMG and nerve conduction studies are often done together. If they are done together, the nerve conduction studies are done before the EMG. Why are they done? You may need an EMG to find diseases that damage your muscles or nerves or to find why you cannot move your muscles (paralysis), why they feel weak, or why they twitch. You may need nerve conduction studies to find damage to the nerves that lead from the brain and spinal cord to the rest of the body (peripheral nervous system). Nerve conduction studies are often used to help find nerve disorders, such as carpal tunnel syndrome. How can you prepare for these tests? · Tell your doctors ALL the medicines, vitamins, supplements, and herbal remedies you take. Some medicines can affect the test results. You may need to stop taking some medicines before you have this test. 
· If you take aspirin or some other blood thinner, be sure to talk to your doctor. He or she will tell you if you should stop taking it before your test. Make sure that you understand exactly what your doctor wants you to do. · Wear loose-fitting clothing. You may be given a hospital gown to wear. · The electrodes for the test are attached to your skin. Your skin needs to be clean and free of sprays, oils, creams, and lotions. What happens during the tests? You lie on a table or bed or sit in a reclining chair so your muscles are relaxed. For an EMG: 
· Your doctor will insert a needle electrode into a muscle. This will record the electrical activity while the muscle is at rest. You may feel a quick, sharp pain when the needle electrode is put into a muscle. · Your doctor will ask you to tighten the same muscle slowly and steadily while the electrical activity is recorded. · Your doctor may move the electrode to a different area of the muscle or a different muscle. For nerve conduction studies: 
· Your doctor will attach two types of electrodes to your skin. ¨ One type of electrode is placed over a nerve and will give the nerve an electrical pulse. ¨ The other type of electrode is placed over the muscle that the nerve controls. It will record how long it takes the muscle to react to the electrical pulse. · You will be able to feel the electrical pulses. They are small shocks and are safe. What else should you know about these tests? · After an EMG, you may be sore and have a tingling feeling in your muscles for up to 2 days. You may have small bruises or swelling at the needle site. · For an EMG, you may be asked to sign a consent form. Talk to your doctor about any concerns you have about the need for the test, its risks, how it will be done, or what the results will mean. How long do they take? · An EMG may take 30 to 60 minutes. · Nerve conduction tests may take from 15 minutes to 1 hour or more. It depends on how many nerves and muscles your doctor tests. What happens after these tests? · If any of the test areas are sore: ¨ Put ice or a cold pack on the area for 10 to 20 minutes at a time. Put a thin cloth between the ice and your skin. ¨ Take an over-the-counter pain medicine, such as acetaminophen (Tylenol), ibuprofen (Advil, Motrin), or naproxen (Aleve). Be safe with medicines. Read and follow all instructions on the label. · You will probably be able to go home right away. · You can go back to your usual activities right away. When should you call for help? Watch closely for changes in your health, and be sure to contact your doctor if: · Muscle pain from an EMG test gets worse or you have swelling, tenderness, or pus at any of the needle sites. · You have any problems that you think may be from the test. 
· You have any questions about the test or have not received your results. Follow-up care is a key part of your treatment and safety. Be sure to make and go to all appointments, and call your doctor if you are having problems. It's also a good idea to keep a list of the medicines you take. Ask your doctor when you can expect to have your test results. Where can you learn more? Go to http://alan-elise.info/. Enter X384 in the search box to learn more about \"Electromyogram (EMG) and Nerve Conduction Studies: About These Tests. \" Current as of: October 14, 2016 Content Version: 11.3 © 5293-3003 [employer]. Care instructions adapted under license by [employer] (which disclaims liability or warranty for this information). If you have questions about a medical condition or this instruction, always ask your healthcare professional. [address] any warranty or liability for your use of this information. Introducing [facility]! [facility] introduces [facility] [place] patient portal. Now you can access parts of your medical record, email your doctor's office, and request medication refills online. 1. In your internet browser, go to [email or web address] 2. Click on the First Time User? Click Here link in the Sign In box. You will see the New Member Sign Up page. 3. Enter your [facility] [place] Access Code exactly as it appears below. You will not need to use this code after youve completed the sign-up process.  If you do not sign up before the expiration date, you must request a new code. · "[employer] Access Code: [email or web address] Expires: 12/21/2017  2:18 PM 
 
4. Enter the last four digits of your Social Security Number (xxxx) and Date of Birth (mm/dd/yyyy) as indicated and click Submit. You will be taken to the next sign-up page. 5. Create a "[employer] ID. This will be your "[employer] login ID and cannot be changed, so think of one that is secure and easy to remember. 6. Create a "[employer] password. You can change your password at any time. 7. Enter your Password Reset Question and Answer. This can be used at a later time if you forget your password. 8. Enter your e-mail address. You will receive e-mail notification when new information is available in [facility] 9. Click Sign Up. You can now view and download portions of your medical record. 10. Click the Download Summary menu link to download a portable copy of your medical information. If you have questions, please visit the Frequently Asked Questions section of the "[employer] website. Remember, "[employer] is NOT to be used for urgent needs. For medical emergencies, dial 911. Now available from your iPhone and Android! Please provide this summary of care documentation to your next provider. Your primary care clinician is listed as [address]. If you have any questions after today's visit, please call [phone].

## 2017-10-17 NOTE — PROGRESS NOTES
Chief Complaint   Patient presents with    Tremors     eval    Pain (Chronic)     legs. feet arms       Referred by: Dr. Venus Montoya      HPI    78-year-old [place] woman who speaks Farsi here with her .  signed a waiver for medical interpretation services. I reviewed the electronic medical record as well. According to the patient and her , she has had chronic painful joints for about a year now. Joints affected include both ankles, elbows, and distal joints in hands bilaterally symmetrically. She complains of also right-sided neck pain. She has exquisite tenderness to the medial elbow more so on the right. She says there is some paresthesias but no numbness. No weakness reported. No color changes of the skin or edema. Initial inflammatory markers done by primary care were unrevealing. She is here for a neurological opinion. Review of Systems   Musculoskeletal: Positive for back pain, joint pain, myalgias and neck pain. Neurological: Positive for tingling. All other systems reviewed and are negative. No past medical history on file. Family History   Problem Relation Age of Onset    No Known Problems Mother     No Known Problems Father     No Known Problems Sister     No Known Problems Brother      Social History     Social History    Marital status: UNKNOWN     Spouse name: N/A    Number of children: N/A    Years of education: N/A     Occupational History    Not on file. Social History Main Topics    Smoking status: Never Smoker    Smokeless tobacco: Never Used    Alcohol use No    Drug use: No    Sexual activity: Yes     Birth control/ protection: Condom     Other Topics Concern    Not on file     Social History Narrative     Current Outpatient Prescriptions   Medication Sig    cholecalciferol, VITAMIN D3, (VITAMIN D3) 5,000 unit tab tablet Take 1 Tab by mouth daily. No current facility-administered medications for this visit.       No Known Allergies      Neurologic Exam     Mental Status   Oriented to person, place, and time. Level of consciousness: alert    Cranial Nerves   Cranial nerves II through XII intact. Motor Exam   Muscle bulk: normal  Overall muscle tone: normal    Strength   Strength 5/5 throughout. Sensory Exam   Light touch normal.     Gait, Coordination, and Reflexes     Gait  Gait: normal    Tremor   Resting tremor: absent    Reflexes   Right brachioradialis: 2+  Left brachioradialis: 2+  Right biceps: 2+  Left biceps: 2+  Right triceps: 2+  Left triceps: 2+  Right patellar: 2+  Left patellar: 2+  Right achilles: 2+  Left achilles: 2+  Right plantar: normal  Left plantar: normal    Physical Exam   Constitutional: She is oriented to person, place, and time. She appears well-developed and well-nourished. Cardiovascular: Normal rate. Pulmonary/Chest: Effort normal.   Neurological: She is oriented to person, place, and time. She has normal strength. Gait normal.   Reflex Scores:       Tricep reflexes are 2+ on the right side and 2+ on the left side. Bicep reflexes are 2+ on the right side and 2+ on the left side. Brachioradialis reflexes are 2+ on the right side and 2+ on the left side. Patellar reflexes are 2+ on the right side and 2+ on the left side. Achilles reflexes are 2+ on the right side and 2+ on the left side. Skin: Skin is warm and dry. Psychiatric: She has a normal mood and affect. Her behavior is normal.   Vitals reviewed.     Visit Vitals    /60    Pulse 98    Resp 16    Ht 5' (1.524 m)    Wt 61.7 kg (136 lb)    LMP 10/01/2017    SpO2 98%    BMI 26.56 kg/m2       Lab Results  Component Value Date/Time   WBC 7.1 09/22/2017 03:59 PM   HGB 12.6 09/22/2017 03:59 PM   HCT 38.2 09/22/2017 03:59 PM   PLATELET 114 57/58/2665 03:59 PM   MCV 83 09/22/2017 03:59 PM     Lab Results  Component Value Date/Time   Hemoglobin A1c 5.0 09/22/2017 03:59 PM   Glucose 74 09/22/2017 03:59 PM Creatinine 0.53 09/22/2017 03:59 PM      No results found for: CHOL, CHOLPOCT, HDL, LDL, LDLC, LDLCPOC, LDLCEXT, TRIGL, TGLPOCT, CHHD, CHHDXLab Results  Component Value Date/Time   ALT (SGPT) 14 09/22/2017 03:59 PM   AST (SGOT) 17 09/22/2017 03:59 PM   Alk. phosphatase 89 09/22/2017 03:59 PM   Bilirubin, total 0.4 09/22/2017 03:59 PM   Albumin 4.4 09/22/2017 03:59 PM   Protein, total 7.2 09/22/2017 03:59 PM   PLATELET 514 01/36/1427 03:59 PM         Assessment and Plan Diagnoses and all orders for this visit:    1. Painful paresthesia  -     EMG NCV MOTOR WITH F/WAVE PER NERVE; Future    2. Arthralgia, unspecified joint  -     REFERRAL TO RHEUMATOLOGY      60-year-old woman having chronic painful joints bilaterally symmetrically with some question of painful paresthesias. This sounds more like an arthritic process appropriate for rheumatology. I have placed a referral for them. I do think it is reasonable to obtain a nerve conduction study to ensure were not dealing with concomitant carpal tunnel or cubital tunnel in the upper extremities. I cannot explain the ankle joint pain neurologically. No weakness on exam nor atrophy to suggest chronic weakness. Reflexes are symmetric. I will see her after the nerve conduction study is done. I have also given them names for various rheumatologists in the area. A notice of this visit/encounter being completed has been sent electronically to the patient's PCP and/or referring provider.      Willie Burden, [address]  Diplomate VANESSAN

## 2017-10-17 NOTE — PATIENT INSTRUCTIONS
Electromyogram (EMG) and Nerve Conduction Studies: About These Tests  What are they? An electromyogram (EMG) measures the electrical activity of your muscles when you are not using them (at rest) and when you tighten them (muscle contraction). Nerve conduction studies (NCS) measure how well and how fast the nerves can send electrical signals. EMG and nerve conduction studies are often done together. If they are done together, the nerve conduction studies are done before the EMG. Why are they done? You may need an EMG to find diseases that damage your muscles or nerves or to find why you cannot move your muscles (paralysis), why they feel weak, or why they twitch. You may need nerve conduction studies to find damage to the nerves that lead from the brain and spinal cord to the rest of the body (peripheral nervous system). Nerve conduction studies are often used to help find nerve disorders, such as carpal tunnel syndrome. How can you prepare for these tests? · Tell your doctors ALL the medicines, vitamins, supplements, and herbal remedies you take. Some medicines can affect the test results. You may need to stop taking some medicines before you have this test.  · If you take aspirin or some other blood thinner, be sure to talk to your doctor. He or she will tell you if you should stop taking it before your test. Make sure that you understand exactly what your doctor wants you to do. · Wear loose-fitting clothing. You may be given a hospital gown to wear. · The electrodes for the test are attached to your skin. Your skin needs to be clean and free of sprays, oils, creams, and lotions. What happens during the tests? You lie on a table or bed or sit in a reclining chair so your muscles are relaxed. For an EMG:  · Your doctor will insert a needle electrode into a muscle.  This will record the electrical activity while the muscle is at rest. You may feel a quick, sharp pain when the needle electrode is put into a muscle. · Your doctor will ask you to tighten the same muscle slowly and steadily while the electrical activity is recorded. · Your doctor may move the electrode to a different area of the muscle or a different muscle. For nerve conduction studies:  · Your doctor will attach two types of electrodes to your skin. ¨ One type of electrode is placed over a nerve and will give the nerve an electrical pulse. ¨ The other type of electrode is placed over the muscle that the nerve controls. It will record how long it takes the muscle to react to the electrical pulse. · You will be able to feel the electrical pulses. They are small shocks and are safe. What else should you know about these tests? · After an EMG, you may be sore and have a tingling feeling in your muscles for up to 2 days. You may have small bruises or swelling at the needle site. · For an EMG, you may be asked to sign a consent form. Talk to your doctor about any concerns you have about the need for the test, its risks, how it will be done, or what the results will mean. How long do they take? · An EMG may take 30 to 60 minutes. · Nerve conduction tests may take from 15 minutes to 1 hour or more. It depends on how many nerves and muscles your doctor tests. What happens after these tests? · If any of the test areas are sore:  ¨ Put ice or a cold pack on the area for 10 to 20 minutes at a time. Put a thin cloth between the ice and your skin. ¨ Take an over-the-counter pain medicine, such as acetaminophen (Tylenol), ibuprofen (Advil, Motrin), or naproxen (Aleve). Be safe with medicines. Read and follow all instructions on the label. · You will probably be able to go home right away. · You can go back to your usual activities right away. When should you call for help?   Watch closely for changes in your health, and be sure to contact your doctor if:  · Muscle pain from an EMG test gets worse or you have swelling, tenderness, or pus at any of the needle sites. · You have any problems that you think may be from the test.  · You have any questions about the test or have not received your results. Follow-up care is a key part of your treatment and safety. Be sure to make and go to all appointments, and call your doctor if you are having problems. It's also a good idea to keep a list of the medicines you take. Ask your doctor when you can expect to have your test results. Where can you learn more? Go to http://alan-elise.info/. Enter D151 in the search box to learn more about \"Electromyogram (EMG) and Nerve Conduction Studies: About These Tests. \"  Current as of: October 14, 2016  Content Version: 11.3  © 7345-9877 [employer]. Care instructions adapted under license by [employer] (which disclaims liability or warranty for this information). If you have questions about a medical condition or this instruction, always ask your healthcare professional. [place] [address] any warranty or liability for your use of this information.

## 2017-10-20 ENCOUNTER — DOCUMENTATION ONLY (OUTPATIENT)
Dept: NEUROLOGY | Age: 37
End: 2017-10-20

## 2017-10-25 ENCOUNTER — TELEPHONE (OUTPATIENT)
Dept: NEUROLOGY | Age: 37
End: 2017-10-25

## 2017-10-25 ENCOUNTER — OFFICE VISIT (OUTPATIENT)
Dept: NEUROLOGY | Age: 37
End: 2017-10-25

## 2017-10-25 DIAGNOSIS — G56.00 CARPAL TUNNEL SYNDROME, UNSPECIFIED LATERALITY: Primary | ICD-10-CM

## 2017-10-25 DIAGNOSIS — G56.03 BILATERAL CARPAL TUNNEL SYNDROME: ICD-10-CM

## 2017-10-25 DIAGNOSIS — G56.03 BILATERAL CARPAL TUNNEL SYNDROME: Primary | ICD-10-CM

## 2017-10-25 NOTE — PROGRESS NOTES
[facility] Neurology [facility]  [address] [place] [address]  Phone [phone] Fax [phone]  Test Date:  10/25/2017    Patient: Benjamin Taylro : 1980 Physician: Mariah Coker    Sex: Female Height: ' 0\" Ref Phys: [employer]   ID#: [id number] Weight:  lbs. Technician: Mirta Newberry     Patient Complaints:  B/L UE PARESTHESIA    NCV & EMG Findings:  Evaluation of the left median/ulnar (palm) comparison and the right median/ulnar (palm) comparison nerves showed abnormal peak latency difference (Median Palm-Ulnar Palm, L0.6, R0.6 ms). All remaining nerves  were within normal limits. All F Wave latencies were within normal limits. All F Wave left vs. right side latency differences were within normal limits. Impression:  Electrodiagnostic evaluation of the bilateral upper extremities and right lower extremity is limited to nerve conduction studies as patient refused needle electrode examination. Findings are as follows:  1. Bilateral median neuropathies at or distal to the wrist consistent with a clinical diagnosis of carpal tunnel syndrome, very mild in degree electrically. 2. No evidence of a a generalized sensorimotor polyneuropathy. ___________________________  Candi Huang DO  Staff Neurologist  Diplomate, American Board of Psychiatry & Neurology             Nerve Conduction Studies  Anti Sensory Summary Table     Stim Site NR Peak (ms) Norm Peak (ms) P-T Amp (µV) Norm P-T Amp Onset (ms) Site1 Site2 Delta-P (ms) Dist (cm) Mundo (m/s) Norm Mundo (m/s)   Left Median Anti Sensory (2nd Digit)  32.3°C   Wrist    3.2 <3.6 48.0 >10 2.0 Wrist 2nd Digit 3.2 14.0 44 >39   Right Median Anti Sensory (2nd Digit)  32.3°C   Wrist    3.3 <3.6 49.1 >10 2.9 Wrist 2nd Digit 3.3 14.0 42 >39   Left Radial Anti Sensory (Base 1st Digit)  32.1°C   Wrist    2.5 <3.1 42.3  1.4 Wrist Base 1st Digit 2.5 0.0     Right Radial Anti Sensory (Base 1st Digit)  32.1°C   Wrist    1.8 <3.1 75.7  1.3 Wrist Base 1st Digit 1.8 0.0     Right Sup Peroneal Anti Sensory (Ant Lat Mall)  30°C   14 cm    2.2 <4.4 29.1 >5.0 1.5 14 cm Ant Lat Mall 2.2 14.0 64 >32   Right Sural Anti Sensory (Lat Mall)  29.9°C   Calf    3.3 <4.0 26.8 >5.0 2.8 Calf Lat Mall 3.3 14.0 42 >35   Left Ulnar Anti Sensory (5th Digit)  32°C   Wrist    2.6 <3.7 44.2 >15.0 2.0 Wrist 5th Digit 2.6 14.0 54 >38   Right Ulnar Anti Sensory (5th Digit)  33°C   Wrist    2.8 <3.7 47.0 >15.0 2.3 Wrist 5th Digit 2.8 14.0 50 >38     Motor Summary Table     Stim Site NR Onset (ms) Norm Onset (ms) O-P Amp (mV) Norm O-P Amp Site1 Site2 Delta-0 (ms) Dist (cm) Mundo (m/s) Norm Mundo (m/s)   Left Median Motor (Abd Poll Brev)  32°C   Wrist    3.0 <4.2 7.5 >5 Elbow Wrist 3.3 23.0 70 >50   Elbow    6.3  7.6          Right Median Motor (Abd Poll Brev)  32°C   Wrist    3.4 <4.2 8.3 >5 Elbow Wrist 3.2 22.0 69 >50   Elbow    6.6  8.2          Right Peroneal Motor (Ext Dig Brev)  30°C   Ankle    2.3 <6.1 5.4 >2.5 B Fib Ankle 6.0 30.0 50 >38   B Fib    8.3  4.2  Poplt B Fib 1.3 10.0 77 >40   Poplt    9.6  4.2          Right Tibial Motor (Abd Decker Brev)  30°C   Ankle    3.0 <6.1 15.0 >3.0 Knee Ankle 8.4 34.0 40 >35   Knee    11.4  12.9          Left Ulnar Motor (Abd Dig Minimi)  32.4°C   Wrist    2.2 <4.2 11.6 >3 B Elbow Wrist 3.0 22.0 73 >53   B Elbow    5.2  10.5  A Elbow B Elbow 1.3 10.0 77 >53   A Elbow    6.5  10.3          Right Ulnar Motor (Abd Dig Minimi)  32.7°C   Wrist    2.1 <4.2 12.7 >3 B Elbow Wrist 3.2 21.0 66 >53   B Elbow    5.3  11.7  A Elbow B Elbow 1.0 10.0 100 >53   A Elbow    6.3  11.5            Comparison Summary Table     Stim Site NR Peak (ms) Norm Peak (ms) P-T Amp (µV) Site1 Site2 Delta-P (ms) Norm Delta (ms)   Left Median/Ulnar Palm Comparison (Wrist - 8cm)     Median Palm    2.3 <2.5 23.3 Median Palm Ulnar Palm 0.6 <0.3   Ulnar Palm    1.7 <2.5 14.6       Right Median/Ulnar Palm Comparison (Wrist - 8cm)     Median Palm    2.2 <2.5 57.4 Median Palm Ulnar Palm 0.6 <0.3   Ulnar Palm    1.6 <2.5 17.1         F Wave Studies     NR F-Lat (ms) Lat Norm (ms) L-R F-Lat (ms) L-R Lat Norm   Right Tibial (Mrkrs) (Abd Hallucis)  30.2°C      42.26 <61  <5.7   Left Ulnar (Mrkrs) (Abd Dig Min)  32.2°C      24.65 <36 0.00 <2.5   Right Ulnar (Mrkrs) (Abd Dig Min)  32.7°C      24.65 <36 0.00 <2.5         Waveforms:

## 2017-10-25 NOTE — TELEPHONE ENCOUNTER
Received a fax from Dr. Nii bahena (Rheumatologist) stating that they do not accept the patient's insurance. Called and spoke with patient's , on HIPAA. Informed him that Dr. Nii bahena does not take his insurance and provided him with the contact number for [facility] and [address] [place] [address] and advised to call to schedule and appointment as they do not call for new patient appointments. Patient's  was given an opportunity to ask questions, repeated information, and verbalized understanding.

## 2017-10-26 NOTE — TELEPHONE ENCOUNTER
Spoke with  on HIPAA about EMG results. He would like to go ahead and get the referral for hand specialist. I verbalized understanding.

## 2017-11-02 ENCOUNTER — DOCUMENTATION ONLY (OUTPATIENT)
Dept: NEUROLOGY | Age: 37
End: 2017-11-02

## 2017-11-10 ENCOUNTER — TELEPHONE (OUTPATIENT)
Dept: NEUROLOGY | Age: 37
End: 2017-11-10

## 2017-11-13 NOTE — TELEPHONE ENCOUNTER
Please let her  now that the nerve conduction study showed carpal tunnel in both her wrists which is nerve irritation. I can send her to a hand specialist if the symptoms are not improving.   I had recommended they see a rheumatologist for the joint pain when I evaluated her so hopefully they have seen a rheumatologist.

## 2017-11-15 ENCOUNTER — TELEPHONE (OUTPATIENT)
Dept: NEUROLOGY | Age: 37
End: 2017-11-15

## 2017-11-20 ENCOUNTER — TELEPHONE (OUTPATIENT)
Dept: NEUROLOGY | Age: 37
End: 2017-11-20